# Patient Record
Sex: MALE | Race: WHITE | NOT HISPANIC OR LATINO | URBAN - METROPOLITAN AREA
[De-identification: names, ages, dates, MRNs, and addresses within clinical notes are randomized per-mention and may not be internally consistent; named-entity substitution may affect disease eponyms.]

---

## 2017-02-16 ENCOUNTER — OFFICE VISIT - HEALTHEAST (OUTPATIENT)
Dept: FAMILY MEDICINE | Facility: CLINIC | Age: 37
End: 2017-02-16

## 2017-02-16 ENCOUNTER — COMMUNICATION - HEALTHEAST (OUTPATIENT)
Dept: FAMILY MEDICINE | Facility: CLINIC | Age: 37
End: 2017-02-16

## 2017-02-16 DIAGNOSIS — R05.9 COUGH: ICD-10-CM

## 2017-02-16 DIAGNOSIS — J02.9 SORE THROAT: ICD-10-CM

## 2017-04-02 ENCOUNTER — RECORDS - HEALTHEAST (OUTPATIENT)
Dept: ADMINISTRATIVE | Facility: OTHER | Age: 37
End: 2017-04-02

## 2017-09-05 ENCOUNTER — RECORDS - HEALTHEAST (OUTPATIENT)
Dept: ADMINISTRATIVE | Facility: OTHER | Age: 37
End: 2017-09-05

## 2017-11-06 ENCOUNTER — OFFICE VISIT (OUTPATIENT)
Dept: OPHTHALMOLOGY | Facility: CLINIC | Age: 37
End: 2017-11-06
Attending: OPHTHALMOLOGY
Payer: COMMERCIAL

## 2017-11-06 DIAGNOSIS — H52.13 MYOPIC ASTIGMATISM OF BOTH EYES: Primary | ICD-10-CM

## 2017-11-06 DIAGNOSIS — H52.203 MYOPIC ASTIGMATISM OF BOTH EYES: Primary | ICD-10-CM

## 2017-11-06 PROCEDURE — 99213 OFFICE O/P EST LOW 20 MIN: CPT | Mod: ZF

## 2017-11-06 PROCEDURE — 92015 DETERMINE REFRACTIVE STATE: CPT | Mod: ZF

## 2017-11-06 RX ORDER — ALBUTEROL SULFATE 90 UG/1
2 AEROSOL, METERED RESPIRATORY (INHALATION)
COMMUNITY
Start: 2017-02-16

## 2017-11-06 ASSESSMENT — TONOMETRY
IOP_METHOD: TONOPEN
OS_IOP_MMHG: 24
OD_IOP_MMHG: 20

## 2017-11-06 ASSESSMENT — VISUAL ACUITY
METHOD: SNELLEN - LINEAR
CORRECTION_TYPE: GLASSES
OD_CC: 20/15
OS_CC+: +2
OD_CC+: -2
OS_CC: 20/20

## 2017-11-06 ASSESSMENT — CONF VISUAL FIELD
OS_NORMAL: 1
OD_NORMAL: 1
METHOD: COUNTING FINGERS

## 2017-11-06 ASSESSMENT — EXTERNAL EXAM - RIGHT EYE: OD_EXAM: NORMAL

## 2017-11-06 ASSESSMENT — EXTERNAL EXAM - LEFT EYE: OS_EXAM: NORMAL

## 2017-11-06 ASSESSMENT — REFRACTION_WEARINGRX
SPECS_TYPE: SVL
OD_CYLINDER: +0.25
OS_CYLINDER: SPHERE
OS_SPHERE: -0.75
OD_AXIS: 070
OD_SPHERE: -0.75

## 2017-11-06 ASSESSMENT — REFRACTION_MANIFEST
OD_SPHERE: -0.75
OD_AXIS: 095
OD_CYLINDER: +0.50
OS_SPHERE: -1.25
OS_CYLINDER: SPHERE

## 2017-11-06 ASSESSMENT — SLIT LAMP EXAM - LIDS
COMMENTS: NORMAL
COMMENTS: NORMAL

## 2017-11-06 ASSESSMENT — CUP TO DISC RATIO
OD_RATIO: 0.2
OS_RATIO: 0.2

## 2017-11-06 NOTE — MR AVS SNAPSHOT
After Visit Summary   2017    Jaspal Pascual    MRN: [id number]           Patient Information     Date Of Birth          1980        Visit Information        Provider Department      2017 1:00 PM [facility]        Today's Diagnoses     Myopic astigmatism of both eyes    -  1       Follow-ups after your visit        Follow-up notes from your care team     Return in about 1 year (around 2018).      Who to contact     Please call your clinic at [phone] to:    Ask questions about your health    Make or cancel appointments    Discuss your medicines    Learn about your test results    Speak to your doctor   If you have compliments or concerns about an experience at your clinic, or if you wish to file a complaint, please contact [facility] Patient Relations at [phone] or email us at [email or web address]         Additional Information About Your Visit        MyChart Information     RedKLEVERt is an electronic gateway that provides easy, online access to your medical records. With Ulmart, you can request a clinic appointment, read your test results, renew a prescription or communicate with your care team.     To sign up for RedKLEVERt visit the website at [email or web address]   You will be asked to enter the access code listed below, as well as some personal information. Please follow the directions to create your username and password.     Your access code is: [email or web address]  Expires: 2018  5:30 AM     Your access code will  in 90 days. If you need help or a new code, please contact your [facility] or call [phone] for assistance.        Care EveryWhere ID     This is your Care EveryWhere ID. This could be used by other organizations to access your [facility] medical records  [id number]         Blood Pressure from Last 3 Encounters:   No data found for BP    Weight from  Last 3 Encounters:   No data found for Wt              Today, you had the following     No orders found for display       Primary Care Provider Office Phone # Fax #    Arie Finley [phone]       [facility] [place] [address]  [place] [facility] [address]        Equal Access to Services     PANFILO DODD : Hadii aad ku hadasho Soomaali, waaxda luqadaha, qaybta kaalmada adeegyada, waxay idiin hayaan adeeg khcheyenneangie lalivn . So [facility] [phone].    ATENCIÓN: Si habla español, tiene a galeano disposición servicios gratuitos de asistencia lingüística. [facility] [phone].    We comply with applicable federal civil rights laws and [place] laws. We do not discriminate on the basis of race, color, national origin, age, disability, sex, sexual orientation, or gender identity.            Thank you!     Thank you for choosing EYE CLINIC  for your care. Our goal is always to provide you with excellent care. Hearing back from our patients is one way we can continue to improve our services. Please take a few minutes to complete the written survey that you may receive in the mail after your visit with us. Thank you!             Your Updated Medication List - Protect others around you: Learn how to safely use, store and throw away your medicines at www.disposemymeds.org.          This list is accurate as of: 11/6/17  2:01 PM.  Always use your most recent med list.                   Brand Name Dispense Instructions for use Diagnosis    albuterol 108 (90 BASE) MCG/ACT Inhaler    PROAIR HFA/PROVENTIL HFA/VENTOLIN HFA     Inhale 2 puffs into the lungs        MULTIVITAMIN ADULT PO      Take 1 tablet by mouth daily        vitamin E 200 UNIT capsule      Take 200 Units by mouth daily

## 2017-11-06 NOTE — NURSING NOTE
Chief Complaints and History of Present Illnesses   Patient presents with     Blurred Vision Both Eyes     HPI    Affected eye(s):  Both   Symptoms:     Floaters (Comment: Occasional floaters in both eyes, no changes.)   No flashes   No redness   No Dryness         Do you have eye pain now?:  No      Comments:  Pt states vision appearing more blurry both distance and near. No eye pain today.    Sánchez VELAZQUEZ November 6, 2017 1:26 PM

## 2017-11-06 NOTE — PROGRESS NOTES
HPI  Jaspal Pascual is a 37 year old male here for full eye exam. He notes his vision is mildly blurred in both eyes at near and distance. No pain, redness, discharge. No flashes/floaters.    POH: Strabismus s/p surgery as a child  PMH: Asthma  FH: No FH of AMD or glc  SH: Former-smoker    Assessment & Plan      (H52.203,  H52.13) Myopic astigmatism of both eyes  (primary encounter diagnosis)  Comment: Excellent vision with refraction  Plan: Given updated glasses Rx.       -----------------------------------------------------------------------------------    Patient disposition:   Return in about 1 year (around 11/6/2018). or sooner as needed.    Teaching statement:  Complete documentation of historical and exam elements from today's encounter can be found in the full encounter summary report (not reduplicated in this progress note). I personally obtained the chief complaint(s) and history of present illness.  I confirmed and edited as necessary the review of systems, past medical/surgical history, family history, social history, and examination findings as documented by others; and I examined the patient myself. I personally reviewed the relevant tests, images, and reports as documented above.     I formulated and edited as necessary the assessment and plan and discussed the findings and management plan with the patient and family.    Edwina Marie MD  Comprehensive Ophthalmology & Ocular Pathology  Department of Ophthalmology and Visual Neurosciences  [email or web address]  Pager [phone]

## 2017-11-08 ENCOUNTER — RECORDS - HEALTHEAST (OUTPATIENT)
Dept: ADMINISTRATIVE | Facility: OTHER | Age: 37
End: 2017-11-08

## 2017-11-13 ENCOUNTER — RECORDS - HEALTHEAST (OUTPATIENT)
Dept: ADMINISTRATIVE | Facility: OTHER | Age: 37
End: 2017-11-13

## 2017-12-21 ENCOUNTER — COMMUNICATION - HEALTHEAST (OUTPATIENT)
Dept: FAMILY MEDICINE | Facility: CLINIC | Age: 37
End: 2017-12-21

## 2017-12-21 ENCOUNTER — OFFICE VISIT - HEALTHEAST (OUTPATIENT)
Dept: FAMILY MEDICINE | Facility: CLINIC | Age: 37
End: 2017-12-21

## 2017-12-21 DIAGNOSIS — R05.9 COUGH: ICD-10-CM

## 2017-12-21 DIAGNOSIS — R53.83 FATIGUE: ICD-10-CM

## 2018-02-07 ENCOUNTER — RECORDS - HEALTHEAST (OUTPATIENT)
Dept: ADMINISTRATIVE | Facility: OTHER | Age: 38
End: 2018-02-07

## 2018-06-28 ENCOUNTER — RECORDS - HEALTHEAST (OUTPATIENT)
Dept: ADMINISTRATIVE | Facility: OTHER | Age: 38
End: 2018-06-28

## 2018-10-24 ENCOUNTER — OFFICE VISIT - HEALTHEAST (OUTPATIENT)
Dept: FAMILY MEDICINE | Facility: CLINIC | Age: 38
End: 2018-10-24

## 2018-10-24 DIAGNOSIS — H60.92 OTITIS EXTERNA, LEFT: ICD-10-CM

## 2018-10-24 DIAGNOSIS — J01.90 ACUTE SINUSITIS: ICD-10-CM

## 2018-10-24 DIAGNOSIS — J20.9 ACUTE BRONCHITIS: ICD-10-CM

## 2019-01-15 ENCOUNTER — OFFICE VISIT - HEALTHEAST (OUTPATIENT)
Dept: FAMILY MEDICINE | Facility: CLINIC | Age: 39
End: 2019-01-15

## 2019-01-15 DIAGNOSIS — R07.0 THROAT PAIN: ICD-10-CM

## 2019-01-15 DIAGNOSIS — J06.9 VIRAL URI WITH COUGH: ICD-10-CM

## 2019-01-15 LAB — DEPRECATED S PYO AG THROAT QL EIA: NORMAL

## 2019-01-16 LAB — GROUP A STREP BY PCR: NORMAL

## 2019-01-22 ENCOUNTER — OFFICE VISIT - HEALTHEAST (OUTPATIENT)
Dept: FAMILY MEDICINE | Facility: CLINIC | Age: 39
End: 2019-01-22

## 2019-01-22 DIAGNOSIS — R05.9 COUGH: ICD-10-CM

## 2019-01-22 DIAGNOSIS — R68.83 CHILLS: ICD-10-CM

## 2019-01-23 ENCOUNTER — OFFICE VISIT - HEALTHEAST (OUTPATIENT)
Dept: INTERNAL MEDICINE | Facility: CLINIC | Age: 39
End: 2019-01-23

## 2019-01-23 ENCOUNTER — COMMUNICATION - HEALTHEAST (OUTPATIENT)
Dept: FAMILY MEDICINE | Facility: CLINIC | Age: 39
End: 2019-01-23

## 2019-01-23 DIAGNOSIS — J40 BRONCHITIS: ICD-10-CM

## 2019-01-23 ASSESSMENT — MIFFLIN-ST. JEOR: SCORE: 2085.13

## 2019-04-05 ENCOUNTER — OFFICE VISIT - HEALTHEAST (OUTPATIENT)
Dept: FAMILY MEDICINE | Facility: CLINIC | Age: 39
End: 2019-04-05

## 2019-04-05 ENCOUNTER — COMMUNICATION - HEALTHEAST (OUTPATIENT)
Dept: FAMILY MEDICINE | Facility: CLINIC | Age: 39
End: 2019-04-05

## 2019-04-05 DIAGNOSIS — Z20.2 POTENTIAL EXPOSURE TO STD: ICD-10-CM

## 2019-04-05 DIAGNOSIS — R30.0 DYSURIA: ICD-10-CM

## 2019-04-05 DIAGNOSIS — M54.50 ACUTE BILATERAL LOW BACK PAIN WITHOUT SCIATICA: ICD-10-CM

## 2019-04-05 LAB
ALBUMIN UR-MCNC: ABNORMAL MG/DL
AMORPH CRY #/AREA URNS HPF: ABNORMAL /[HPF]
APPEARANCE UR: CLEAR
BACTERIA #/AREA URNS HPF: ABNORMAL HPF
BILIRUB UR QL STRIP: NEGATIVE
COLOR UR AUTO: YELLOW
GLUCOSE UR STRIP-MCNC: NEGATIVE MG/DL
HGB UR QL STRIP: NEGATIVE
KETONES UR STRIP-MCNC: NEGATIVE MG/DL
LEUKOCYTE ESTERASE UR QL STRIP: ABNORMAL
MUCOUS THREADS #/AREA URNS LPF: ABNORMAL LPF
NITRATE UR QL: NEGATIVE
PH UR STRIP: 6.5 [PH] (ref 5–8)
RBC #/AREA URNS AUTO: ABNORMAL HPF
SP GR UR STRIP: 1.02 (ref 1–1.03)
SQUAMOUS #/AREA URNS AUTO: ABNORMAL LPF
UROBILINOGEN UR STRIP-ACNC: ABNORMAL
WBC #/AREA URNS AUTO: ABNORMAL HPF

## 2019-04-06 LAB — BACTERIA SPEC CULT: NO GROWTH

## 2019-04-08 ENCOUNTER — COMMUNICATION - HEALTHEAST (OUTPATIENT)
Dept: FAMILY MEDICINE | Facility: CLINIC | Age: 39
End: 2019-04-08

## 2019-04-08 LAB
C TRACH DNA SPEC QL PROBE+SIG AMP: NEGATIVE
N GONORRHOEA DNA SPEC QL NAA+PROBE: NEGATIVE

## 2019-09-19 ENCOUNTER — OFFICE VISIT - HEALTHEAST (OUTPATIENT)
Dept: FAMILY MEDICINE | Facility: CLINIC | Age: 39
End: 2019-09-19

## 2019-09-19 DIAGNOSIS — M70.22 OLECRANON BURSITIS OF LEFT ELBOW: ICD-10-CM

## 2019-09-19 DIAGNOSIS — R30.0 DYSURIA: ICD-10-CM

## 2019-09-19 DIAGNOSIS — M25.572 PAIN IN JOINT INVOLVING ANKLE AND FOOT, LEFT: ICD-10-CM

## 2019-09-19 LAB
ALBUMIN UR-MCNC: ABNORMAL MG/DL
APPEARANCE UR: CLEAR
BACTERIA #/AREA URNS HPF: ABNORMAL /[HPF]
BILIRUB UR QL STRIP: ABNORMAL
COLOR UR AUTO: YELLOW
GLUCOSE UR STRIP-MCNC: NEGATIVE MG/DL
HGB UR QL STRIP: NEGATIVE
KETONES UR STRIP-MCNC: ABNORMAL MG/DL
LEUKOCYTE ESTERASE UR QL STRIP: NEGATIVE
NITRATE UR QL: NEGATIVE
PH UR STRIP: 6.5 [PH] (ref 5–8)
RBC #/AREA URNS AUTO: ABNORMAL /[HPF]
SP GR UR STRIP: 1.02 (ref 1–1.03)
SQUAMOUS #/AREA URNS AUTO: ABNORMAL /[HPF]
UROBILINOGEN UR STRIP-ACNC: ABNORMAL
WBC #/AREA URNS AUTO: ABNORMAL /[HPF]

## 2019-09-20 LAB
C TRACH DNA SPEC QL PROBE+SIG AMP: NEGATIVE
N GONORRHOEA DNA SPEC QL NAA+PROBE: NEGATIVE

## 2019-12-05 ENCOUNTER — RECORDS - HEALTHEAST (OUTPATIENT)
Dept: ADMINISTRATIVE | Facility: OTHER | Age: 39
End: 2019-12-05

## 2020-03-18 ENCOUNTER — OFFICE VISIT - HEALTHEAST (OUTPATIENT)
Dept: FAMILY MEDICINE | Facility: CLINIC | Age: 40
End: 2020-03-18

## 2020-03-18 DIAGNOSIS — J20.9 ACUTE BRONCHITIS, UNSPECIFIED ORGANISM: ICD-10-CM

## 2020-03-18 DIAGNOSIS — Z20.828 EXPOSURE TO INFLUENZA: ICD-10-CM

## 2020-06-23 ENCOUNTER — RECORDS - HEALTHEAST (OUTPATIENT)
Dept: ADMINISTRATIVE | Facility: OTHER | Age: 40
End: 2020-06-23

## 2021-05-27 ENCOUNTER — RECORDS - HEALTHEAST (OUTPATIENT)
Dept: ADMINISTRATIVE | Facility: CLINIC | Age: 41
End: 2021-05-27

## 2021-05-27 NOTE — TELEPHONE ENCOUNTER
----- Message from Elisabeth Proctor CNP sent at 4/8/2019  2:22 PM CDT -----  Please let patient know of negative results

## 2021-05-30 VITALS — HEIGHT: 74 IN

## 2021-05-31 VITALS — BODY MASS INDEX: 30.81 KG/M2 | WEIGHT: 240 LBS

## 2021-06-01 NOTE — PATIENT INSTRUCTIONS - HE
Your urine test does not show any signs of a urinary tract infection.  We will hold off on any treatment until your Gonorrhea and Chlamydia test come back.  If your Gonorrhea and Chlamydia tests are negative I will recommend follow-up with urology for further investigation into why you are having burning urinary pain.    Her left ankle pain is likely from gout.  Begin taking prednisone 2 tablets daily for the next 5 days for gout treatment.    Your left elbow is swollen due to olecranon bursitis.  This does not appear to be infectious.  It could be a result of gout, but you are already started on gout treatment.  Recommend icing for 20 minutes at a time every 4-5 hours.  I also recommend protecting the joint using an Ace wrap or elbow brace.  Follow-up if you have not had any improvement in your elbow swelling over the course the next 7 days.

## 2021-06-01 NOTE — PROGRESS NOTES
Chief Complaint   Patient presents with     Gout     x3-4d, L ankle     Burning sensation when urinating     x2wks,      Fluid in L elbow     x2wks, slowly growing       HPI:  Jaspal Pascual is a 39 y.o. male who presents today complaining of multiple issues.  Patient has been experiencing suspected gout of the left ankle for the past 3 to 4 days.  He has previously had confirmed gout before in the same location.  Patient is also been experiencing burning dysuria for the past 2 weeks.  Patient was previously seen for dysuria on 2019.  At that time he was tested for urine culture, gonorrhea, and chlamydia.  He was negative for all of these tests.  He has had a single new sexual encounter since his last STD testing.  He was wearing protection, but the condom broke.  He denies any scrotal swelling, testicular pain, penile discharge, or redness or rash of the head of the penis, he is also not had any back pain out of the ordinary, fever, chills, abdominal pain, or lymph node in the groin area.  Patient has also been experiencing going amount of fluid on the left elbow for the past 2 weeks.  No known trauma.  He has not had any fevers or chills.  There is been no redness or warmth to the elbow.    History obtained from the patient.    Problem List:  Acute Pharyngitis  Hyperlipidemia  Abdominal Pain  Lower Back Pain  Pain During Urination (Dysuria)      No past medical history on file.    Social History     Tobacco Use     Smoking status: Former Smoker     Types: Cigarettes     Last attempt to quit: 2006     Years since quittin.7     Smokeless tobacco: Current User     Types: Chew   Substance Use Topics     Alcohol use: Not on file       Review of Systems    Vitals:    19 1243 19 1326   BP: (!) 141/91 138/88   Patient Site: Right Arm Right Arm   Patient Position: Sitting Sitting   Cuff Size: Adult Large Adult Large   Pulse: 62    Resp: 16    Temp: 98.1  F (36.7  C)    TempSrc: Oral    SpO2:  97%    Weight: (!) 257 lb 8 oz (116.8 kg)        Physical Exam    No notes on file    Labs:  Recent Results (from the past 72 hour(s))   Urinalysis-UC if Indicated   Result Value Ref Range    Color, UA Yellow Colorless, Yellow, Straw, Light Yellow    Clarity, UA Clear Clear    Glucose, UA Negative Negative    Bilirubin, UA Small (!) Negative    Ketones, UA Trace (!) Negative    Specific Gravity, UA 1.025 1.005 - 1.030    Blood, UA Negative Negative    pH, UA 6.5 5.0 - 8.0    Protein, UA 30 mg/dL (!) Negative mg/dL    Urobilinogen, UA 0.2 E.U./dL 0.2 E.U./dL, 1.0 E.U./dL    Nitrite, UA Negative Negative    Leukocytes, UA Negative Negative    Bacteria, UA      RBC, UA      WBC, UA      Squam Epithel, UA             Clinical Decision Making:  Left olecranon bursitis noted.  No current signs of septic bursitis.  This may be gout, however aspiration is not necessary as we are already treating for left ankle gout.  Recommend supportive cares with joint protection and icing.  Recommend that he follow-up if symptoms are not improving over the course the next 1 week.  Dysuria is becoming a chronic issue.  UA is not indicative of UTI.  GC/CT is pending.  We will hold off on any treatment as patient is not significantly high risk.  I recommend that he be referred to urology if his GC/CT are negative and his symptoms persist.  Patient is agreeable to this plan.  At the end of the encounter, I discussed results, diagnosis, medications. Discussed red flags for immediate return to clinic/ER, as well as indications for follow up if no improvement. Patient understood and agreed to plan. Patient was stable for discharge.    1. Olecranon bursitis of left elbow     2. Dysuria  Urinalysis-UC if Indicated    Chlamydia trachomatis & Neisseria gonorrhoeae, Amplified Detection   3. Pain in joint involving ankle and foot, left  predniSONE (DELTASONE) 20 MG tablet         Patient Instructions   Your urine test does not show any signs of a  urinary tract infection.  We will hold off on any treatment until your Gonorrhea and Chlamydia test come back.  If your Gonorrhea and Chlamydia tests are negative I will recommend follow-up with urology for further investigation into why you are having burning urinary pain.    Her left ankle pain is likely from gout.  Begin taking prednisone 2 tablets daily for the next 5 days for gout treatment.    Your left elbow is swollen due to olecranon bursitis.  This does not appear to be infectious.  It could be a result of gout, but you are already started on gout treatment.  Recommend icing for 20 minutes at a time every 4-5 hours.  I also recommend protecting the joint using an Ace wrap or elbow brace.  Follow-up if you have not had any improvement in your elbow swelling over the course the next 7 days.

## 2021-06-02 VITALS — BODY MASS INDEX: 32.1 KG/M2 | WEIGHT: 250 LBS

## 2021-06-02 VITALS — WEIGHT: 263.38 LBS | BODY MASS INDEX: 33.82 KG/M2

## 2021-06-02 VITALS — HEIGHT: 74 IN | WEIGHT: 247 LBS | BODY MASS INDEX: 31.7 KG/M2

## 2021-06-02 VITALS — BODY MASS INDEX: 31.19 KG/M2 | WEIGHT: 242.9 LBS

## 2021-06-02 VITALS — BODY MASS INDEX: 31.85 KG/M2 | WEIGHT: 248.1 LBS

## 2021-06-03 VITALS
OXYGEN SATURATION: 97 % | WEIGHT: 257.5 LBS | SYSTOLIC BLOOD PRESSURE: 138 MMHG | DIASTOLIC BLOOD PRESSURE: 88 MMHG | BODY MASS INDEX: 33.06 KG/M2 | RESPIRATION RATE: 16 BRPM | TEMPERATURE: 98.1 F | HEART RATE: 62 BPM

## 2021-06-04 VITALS
WEIGHT: 252.4 LBS | HEART RATE: 71 BPM | RESPIRATION RATE: 14 BRPM | BODY MASS INDEX: 32.41 KG/M2 | DIASTOLIC BLOOD PRESSURE: 81 MMHG | TEMPERATURE: 98.3 F | OXYGEN SATURATION: 98 % | SYSTOLIC BLOOD PRESSURE: 144 MMHG

## 2021-06-08 NOTE — PROGRESS NOTES
SUBJECTIVE:    This is a pleasant 37-year-old male who presents to the clinic as he has had a 1-2 week history of a constellation of symptoms.  He has developed a harsh cough and his cough has become increasingly productive.  He has had a sense of tightness in the chest and feels wheezy.  He denies known history of asthma.  He is not a smoker.  He has had some pressure in his cheeks.  His energy level has been quite low.        Patient Active Problem List   Diagnosis     Acute Pharyngitis     Hyperlipidemia     Abdominal Pain     Lower Back Pain     Pain During Urination (Dysuria)       No current outpatient prescriptions on file prior to visit.     No current facility-administered medications on file prior to visit.        No Known Allergies         A 12 point comprehensive review of systems was negative except as noted.      OBJECTIVE:     Vitals:    02/16/17 1622   BP: 138/80   Pulse: 83   Resp: 20   Temp: 98.8  F (37.1  C)   SpO2: 95%       General: Alert, not acutely distressed   Head:  Atraumatic, normocephalic  Ears:  TMs normal pearly-gray  Eyes:  PERRL, extraocular movements are intact  Nose:  Septum midline  Throat:  Oral mucosa moist, oropharynx clear  Neck:  Supple without adenopathy or thyromegaly   Cardiovascular: S1-S2 with regular rate and without murmur, rub, or gallop   Lungs:  Bilateral expiratory wheezes noted  There are coarse upper respiratory breath sounds  Extremities: Without cyanosis or edema   Neuro:  CN II-XII appear intact        Laboratory Results:    Results for orders placed or performed in visit on 02/16/17   Rapid Strep A Screen-Throat   Result Value Ref Range    Rapid Strep A Antigen No Group A Strep detected No Group A Strep detected   Influenza A/B Rapid Test   Result Value Ref Range    Influenza  A, Rapid Antigen No Influenza A antigen detected No Influenza A antigen detected    Influenza B, Rapid Antigen No Influenza B antigen detected No Influenza B antigen detected   Group A  Strep, RNA Direct Detection, Throat   Result Value Ref Range    Group A Strep by PCR No Group A Strep rRNA detected No Group A Strep rRNA detected     Radiology:    XR Chest PA and Lateral (Order [id number])   Imaging   Date: 2/16/2017 Department: [facility] Medicine and Obstetrics Ordering/Authorizing: Arie Ramos MD   Study Result   XR CHEST PA AND LATERAL2/16/2017 5:13 PMINDICATION: CoughCOMPARISON: None.FINDINGS: Negative chest.This report was electronically interpreted by: Dr. Harvinder Angel MD ON 02/16/2017 at 21:52         ASSESSMENT AND PLAN:    1. Cough  2. Sore throat    Chest x-ray is negative for acute infiltrate  Rapid strep test is negative and influenza testing is negative  - Rapid Strep A Screen-Throat  - Influenza A/B Rapid Test  - Group A Strep, RNA Direct Detection, Throat  - XR Chest PA and Lateral    Given his clinical presentation treat with azithromycin, Z-Bob for possible subclinical pneumonia or atypical infection  Given significant wheezing treated with prednisone for 5 days  Prescribed albuterol inhaler to use as needed  If he has recurrent problems with wheezing consider referral for pulmonary function testing  Patient agrees to follow-up as needed or if not improving    25 minutes were spent with the patient and greater than 50% of the time was spent in face to face counseling and coordination of care        Arie Ramos MD      This note has been dictated and transcribed using voice recognition software.  Any grammatical or contextual distortions are unintentional and inherent to the software.

## 2021-06-14 NOTE — PROGRESS NOTES
Assessment/ Plan     1. Cough  As patient has been coughing for over 3 weeks and is feeling quite fatigued, could make an argument to treat for bronchitis.  Will put him on a Z-Bob as directed.  We will get him started on an inhaler 2 puffs every 4-6 hours as needed.  Chest x-ray was negative for infiltrate.  Follow-up if symptoms not improving over the next several weeks.  - XR Chest 2 Views  - azithromycin (ZITHROMAX) 250 MG tablet; Take two tablets po on day one then one tablet po q day on days 2-5.  Dispense: 6 tablet; Refill: 0  - albuterol (PROAIR HFA;PROVENTIL HFA;VENTOLIN HFA) 90 mcg/actuation inhaler; Inhale 2 puffs every 6 (six) hours as needed for wheezing or shortness of breath.  Dispense: 1 Inhaler; Refill: 1    2. Fatigue  Reassurance given to patient that his CBC and mono are both normal today.  This is all likely result of a viral process.  Recommend rest, fluids, and follow-up if no improvement in symptoms over the next month or so.  If diarrhea is not resolving in the next several weeks, would recommend a coming back in to check for C. difficile as he was on antibiotics last month for strep throat.  Reassurance given that the red spots on his chest are normal and happen with aging.  - HM2(CBC w/o Differential)  - Mononucleosis Screen      Subjective:       Jaspal Pascual is a 37 y.o. male who presents for evaluation of an illness.  He states that for about the last 3 weeks he has had extreme fatigue.  He sleeping 10-11 hours per night and still feeling tired.  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  He said a cough which has been productive and seems to be worse at night.  He has had diarrhea but no blood in the stool or vomiting.  He has not had a fever.  He has had a couple of red spots on his chest that he wanted me to check out.  He states he had strep about a month ago and was treated with penicillin.  He was feeling better and then this was a new illness.  It does not  feel like strep to him.  He is otherwise healthy roberto without a history of asthma or wheezing.  He is a non-smoker.  He does feel a little achy.  He is not having a sore throat.    Relevant past medical, family, surgical, and social history reviewed with patient, unless noted in HPI, not pertinent for this visit.    Review of Systems   A 12 point comprehensive review of systems was negative except as noted.      Current Outpatient Prescriptions   Medication Sig Dispense Refill     albuterol (PROAIR HFA;PROVENTIL HFA;VENTOLIN HFA) 90 mcg/actuation inhaler Inhale 2 puffs every 6 (six) hours as needed for wheezing or shortness of breath. 1 Inhaler 1     azithromycin (ZITHROMAX) 250 MG tablet Take two tablets po on day one then one tablet po q day on days 2-5. 6 tablet 0     predniSONE (DELTASONE) 20 MG tablet Take two po Qday x 5 days 10 tablet 0     No current facility-administered medications for this visit.        Objective:      /78 (Patient Site: Right Arm, Patient Position: Sitting, Cuff Size: Adult Regular)  Pulse 74  Temp 97.9  F (36.6  C)  Wt (!) 240 lb (108.9 kg)  SpO2 97%  BMI 30.81 kg/m2      General appearance: alert, appears stated age and cooperative  Head: Normocephalic, without obvious abnormality, atraumatic  Eyes: conjunctivae/corneas clear. PERRL, EOM's intact. Fundi benign.  Ears: normal TM's and external ear canals both ears  Nose: Nares normal. Septum midline. Mucosa normal. No drainage or sinus tenderness.  Throat: lips, mucosa, and tongue normal; teeth and gums normal  Neck: no adenopathy  Lungs: clear to auscultation bilaterally  Heart: regular rate and rhythm, S1, S2 normal, no murmur, click, rub or gallop    Chest x-ray: Personally reviewed, no infiltrate.    Recent Results (from the past 168 hour(s))   HM2(CBC w/o Differential)   Result Value Ref Range    WBC 5.7 4.0 - 11.0 thou/uL    RBC 5.26 4.40 - 6.20 mill/uL    Hemoglobin 15.3 14.0 - 18.0 g/dL    Hematocrit 45.9 40.0 - 54.0 %     MCV 87 80 - 100 fL    MCH 29.1 27.0 - 34.0 pg    MCHC 33.3 32.0 - 36.0 g/dL    RDW 11.1 11.0 - 14.5 %    Platelets 211 140 - 440 thou/uL    MPV 7.2 7.0 - 10.0 fL   Mononucleosis Screen   Result Value Ref Range    Mono Screen Negative Negative          This note has been dictated using voice recognition software. Any grammatical or context distortions are unintentional and inherent to the software

## 2021-06-17 NOTE — PATIENT INSTRUCTIONS - HE
Patient Instructions by Kael Ponce MD at 1/15/2019  3:50 PM     Author: Kael Ponce MD Service: -- Author Type: Physician    Filed: 1/15/2019  4:29 PM Encounter Date: 1/15/2019 Status: Addendum    : Kael Ponce MD (Physician)    Related Notes: Original Note by Kael Ponce MD (Physician) filed at 1/15/2019  4:29 PM       Advised fluids, salt water gargles, Tylenol or Ibuprofen as needed for sore throat.       Patient Education     Viral Upper Respiratory Illness (Adult)  You have a viral upper respiratory illness (URI), which is another term for the common cold. This illness is contagious during the first few days. It is spread through the air by coughing and sneezing. It may also be spread by direct contact (touching the sick person and then touching your own eyes, nose, or mouth). Frequent handwashing will decrease risk of spread. Most viral illnesses go away within 7 to 10 days with rest and simple home remedies. Sometimes the illness may last for several weeks. Antibiotics will not kill a virus, and they are generally not prescribed for this condition.    Home care    If symptoms are severe, rest at home for the first 2 to 3 days. When you resume activity, don't let yourself get too tired.    Avoid being exposed to cigarette smoke (yours or others).    You may use acetaminophen or ibuprofen to control pain and fever, unless another medicine was prescribed. If you have chronic liver or kidney disease, have ever had a stomach ulcer or gastrointestinal bleeding, or are taking blood-thinning medicines, talk with your healthcare provider before using these medicines. Aspirin should never be given to anyone under 18 years of age who is ill with a viral infection or fever. It may cause severe liver or brain damage.    Your appetite may be poor, so a light diet is fine. Avoid dehydration by drinking 6 to 8 glasses of fluids per day (water, soft drinks, juices, tea, or soup). Extra  fluids will help loosen secretions in the nose and lungs.    Over-the-counter cold medicines will not shorten the length of time youre sick, but they may be helpful for the following symptoms: cough, sore throat, and nasal and sinus congestion. (Note: Do not use decongestants if you have high blood pressure.)  Follow-up care  Follow up with your healthcare provider, or as advised.  When to seek medical advice  Call your healthcare provider right away if any of these occur:    Cough with lots of colored sputum (mucus)    Severe headache; face, neck, or ear pain    Difficulty swallowing due to throat pain    Fever of 100.4 F (38 C) or higher, or as directed by your healthcare provider  Call 911  Call 911 if any of these occur:    Chest pain, shortness of breath, wheezing, or difficulty breathing    Coughing up blood    Inability to swallow due to throat pain  Date Last Reviewed: 9/13/2015 2000-2017 [employer]. [address] [place] [address], [place] [address]. All rights reserved. This information is not intended as a substitute for professional medical care. Always follow your healthcare professional's instructions.

## 2021-06-17 NOTE — PATIENT INSTRUCTIONS - HE
Patient Instructions by Elisabeth Proctor CNP at 4/5/2019  8:20 AM     Author: Elisabeth Proctor CNP Service: -- Author Type: Nurse Practitioner    Filed: 4/5/2019  8:55 AM Encounter Date: 4/5/2019 Status: Addendum    : Elisabeth Proctor CNP (Nurse Practitioner)    Related Notes: Original Note by Elisabeth Proctor CNP (Nurse Practitioner) filed at 4/5/2019  8:53 AM       Your back pain is likely due to a muscular strain.     Use Naproxen for pain management.    You may use the Zanaflex as needed for muscle spasm. Use caution while taking this medication, as it can make you drowsy. Do not take while driving, operating heavy machinery, or doing any activities requiring intense concentration.    Try using a heating pad and/or warm baths.    Make sure to keep moving to avoid getting stiff. See below for stretching exercises.    If you develop fever, severe pain that prevents you from walking at all, weakness of your arms or legs, loss of bowel or bladder continence, or any other new concerning symptoms, go to the ER immediately.    Otherwise, follow up with primary care doctor as needed or if no improvement in pain in symptoms in 1 week.    You have been treated today to cover for gonorrhea and chlamydia infections.  These infections are treated with one time doses of medications, so no further treatment is necessary.  Avoid sexual contact for one week.  We will contact you when test results are available, usually 2 days.  Consider comprehensive STD testing including HIV and syphilis.  This can be done through your primary care provider if you change your mind.  Use condoms to avoid reinfection.              Ibuprofen/Naproxen Discharge Instructions:  You have been prescribed Naproxen for pain control.  The maximum dose of naproxen is 1100 mg in a 24-hour period.    Take this medication with food to prevent stomach irritation.  With long-term use this medication can irritate the stomach causing pain and  lead to development of a stomach ulcer.  If you notice stomach pain or vomiting of coffee-ground colored vomit or blood, please be seen by a healthcare provider.  Attempt to use this medication for the shortest time possible.

## 2021-06-18 NOTE — LETTER
Letter by Kael Ponce MD at      Author: Kael Ponce MD Service: -- Author Type: --    Filed:  Encounter Date: 1/15/2019 Status: (Other)       January 15, 2019     Patient: Jaspal Pascual   YOB: 1980   Date of Visit: 1/15/2019       To Whom It May Concern:    It is my medical opinion that Jaspal Pascual may return to work on 1/16/19.    If you have any questions or concerns, please don't hesitate to call.    Sincerely,        Electronically signed by Kael Ponce MD

## 2021-06-18 NOTE — LETTER
Letter by Arie Ramos MD at      Author: Arie Ramos MD Service: -- Author Type: --    Filed:  Encounter Date: 1/23/2019 Status: (Other)       January 23, 2019     Patient: Jaspal Pascual   YOB: 1980   Date of Visit: 1/23/2019       To Whom It May Concern:    I am the primary care physician of Jaspal Pascual.  He has had a significant respiratory illness recently and has had 2 urgent care visits including January 15 and January 22, 2019.  Given his respiratory illness please excuse him from work from January 21 - January 24, 2019.  He is following the treatment recommendations.    Thank you for your time and consideration.    If you have any questions or concerns, please don't hesitate to call.    Sincerely,        Electronically signed by Arie Ramos MD

## 2021-06-18 NOTE — PATIENT INSTRUCTIONS - HE
Patient Instructions by Dimitrios Hobson PA-C at 3/18/2020  9:10 AM     Author: Dimitrios Hobson PA-C Service: -- Author Type: Physician Assistant    Filed: 3/18/2020  9:37 AM Encounter Date: 3/18/2020 Status: Addendum    : Dimitrios Hobson PA-C (Physician Assistant)    Related Notes: Original Note by Dimitrios Hobson PA-C (Physician Assistant) filed at 3/18/2020  9:36 AM       You were seen today for acute bronchitis.     Symptom management:  - Get plenty of rest  - Avoid smoking and second hand smoke  - May take tylenol or ibuprofen for fever/discomfort  - Drink plenty of non-caffeinated fluids  - Use nasal steroid spray for sinus congestion  - Albuterol inhaler may be used every 6 hours as needed for chest tightness      Reasons to be seen in the emergency room:  - Develop a fever of 100.4 or higher  - Cough changes, coughing up blood, or become short of breath  - Neck stiffness  - Chest pain  - Severe headache  - Unable to tolerate eating or drinking fluids    Otherwise, if no symptom improvement after 5 days, follow-up with your primary care provider.      Patient Education     Bronchitis, Antibiotic Treatment (Adult)    Bronchitis is an infection of the air passages (bronchial tubes) in your lungs. It often occurs when you have a cold. This illness is contagious during the first few days and is spread through the air by coughing and sneezing, or by direct contact (touching the sick person and then touching your own eyes, nose, or mouth).  Symptoms of bronchitis include cough with mucus (phlegm) and low-grade fever. Bronchitis usually lasts 7 to 14 days. Mild cases can be treated with simple home remedies. More severe infection is treated with an antibiotic.  Home care  Follow these guidelines when caring for yourself at home:    If your symptoms are severe, rest at home for the first 2 to 3 days. When you go back to your usual activities, don't let yourself get too tired.    Do not smoke. Also avoid being exposed to  secondhand smoke.    You may use over-the-counter medicines to control fever or pain, unless another medicine was prescribed. If you have chronic liver or kidney disease or have ever had a stomach ulcer or gastrointestinal bleeding, talk with your healthcare provider before using these medicines. Also talk to your provider if you are taking medicine to prevent blood clots. Aspirin should never be given to anyone younger than 18 years of age who is ill with a viral infection or fever. It may cause severe liver or brain damage.    Your appetite may be poor, so a light diet is fine. Avoid dehydration by drinking 6 to 8 glasses of fluids per day (such as water, soft drinks, sports drinks, juices, tea, or soup). Extra fluids will help loosen secretions in the nose and lungs.    Over-the-counter cough, cold, and sore-throat medicines will not shorten the length of the illness, but they may be helpful to reduce symptoms. (Note: Do not use decongestants if you have high blood pressure.)    Finish all antibiotic medicine. Do this even if you are feeling better after only a few days.  Follow-up care  Follow up with your healthcare provider, or as advised. If you had an X-ray or ECG (electrocardiogram), a specialist will review it. You will be notified of any new findings that may affect your care.  If you are age 65 or older, or if you have a chronic lung disease or condition that affects your immune system, or you smoke, ask your healthcare provider about getting a pneumococcal vaccine and a yearly flu shot (influenza vaccine).  When to seek medical advice  Call your healthcare provider right away if any of these occur:    Fever of 100.4 F (38 C) or higher, or as directed by your healthcare provider    Coughing up increased amounts of colored sputum    Weakness, drowsiness, headache, facial pain, ear pain, or a stiff neck  Call 911  Call 911 if any of these occur.    Coughing up blood    Worsening weakness, drowsiness,  headache, or stiff neck    Trouble breathing, wheezing, or pain with breathing  Date Last Reviewed: 9/13/2015 2000-2017 [employer]. [address] [place] [address], [place] [address]. All rights reserved. This information is not intended as a substitute for professional medical care. Always follow your healthcare professional's instructions.

## 2021-06-19 NOTE — LETTER
Letter by Eva Arredondo PA-C at      Author: Eva Arredondo PA-C Service: -- Author Type: --    Filed:  Encounter Date: 9/19/2019 Status: (Other)         September 19, 2019     Patient: Jaspal Pascual   YOB: 1980   Date of Visit: 9/19/2019       To Whom it May Concern:    Jaspal Pascual was seen in my clinic on 9/19/2019.    If you have any questions or concerns, please don't hesitate to call.    Sincerely,         Electronically signed by Eva Arredondo PA-C

## 2021-06-20 NOTE — LETTER
Letter by Dimitrios Hobson PA-C at      Author: Dimitrios Hobson PA-C Service: -- Author Type: --    Filed:  Encounter Date: 3/18/2020 Status: (Other)         March 18, 2020     Patient: Jaspal Pascual   YOB: 1980   Date of Visit: 3/18/2020       To Whom It May Concern:    It is my medical opinion that Jaspal Pascual may return to work on 03/19/2020.    If you have any questions or concerns, please don't hesitate to call.    Sincerely,        Electronically signed by Dimitrios Hobson PA-C

## 2021-06-20 NOTE — LETTER
Letter by Dimitrios Hobson PA-C at      Author: Dimitrios Hobson PA-C Service: -- Author Type: --    Filed:  Encounter Date: 3/18/2020 Status: (Other)         March 18, 2020     Patient: Jaspal Pascual   YOB: 1980   Date of Visit: 3/18/2020       To Whom it May Concern:    Jaspal Pascual was seen in my clinic on 3/18/2020.  Patient is able to return to work on 3/18/2020.    If you have any questions or concerns, please don't hesitate to call.    Sincerely,         Electronically signed by Dimitrios Hobson PA-C

## 2021-06-21 NOTE — PROGRESS NOTES
Chief Complaint   Patient presents with     Cough     x 3.5 weeks.  Not getting better.  Ears hurt and bleeding.  Nose blowing blood.  No fevers but body aches and chills  Starting to feel deep in chest.         HPI:      Patient is here for 3.5 wks of green nasal discharge with congestion and sinus pressure, and a cough productive with green sputum. Sometimes he noted bloody nasal discharge after he blew his nose, no persistent nose bleeding. Yesterday he noted small bleeding from left ear. No ear pain. No fever, shortness of breath. But he reported body aches and chills.     ROS: Pertinent ROS noted in HPI.     Allergies   Allergen Reactions     Simvastatin Myalgia     All statins have same effect.       Patient Active Problem List   Diagnosis     Acute Pharyngitis     Hyperlipidemia     Abdominal Pain     Lower Back Pain     Pain During Urination (Dysuria)       No family history on file.    Social History     Social History     Marital status: Single     Spouse name: N/A     Number of children: N/A     Years of education: N/A     Occupational History     Not on file.     Social History Main Topics     Smoking status: Former Smoker     Types: Cigarettes     Quit date: 1/1/2006     Smokeless tobacco: Current User     Types: Chew     Alcohol use Not on file     Drug use: Not on file     Sexual activity: Not on file     Other Topics Concern     Not on file     Social History Narrative         Objective:    Vitals:    10/24/18 1003   BP: 124/84   Pulse: (!) 50   Resp: 24   Temp: 98.8  F (37.1  C)   SpO2: 96%       Gen: NAD  Throat: oropharynx clear, tonsils normal  Ears: TMs clear without effusion, R ear canal normal with minimal cerumen. There is moderate erythema of left ear canal with inflammation and there is a small scab on left ear canal without surrounding edema nor purulence.   Nose: boggy nasal mucosa bilaterally  Sinus: pain to percussion of maxillary sinuses bilaterally   Neck: No adenopathy  CV: RRR,  normal S1S2, no M,R, G  Pulm: CTAB, normal effort        Acute sinusitis  -     doxycycline (VIBRA-TABS) 100 MG tablet; Take 1 tablet (100 mg total) by mouth 2 (two) times a day for 10 days.    Acute bronchitis  -     doxycycline (VIBRA-TABS) 100 MG tablet; Take 1 tablet (100 mg total) by mouth 2 (two) times a day for 10 days.    Otitis externa, left  -     neomycin-polymyxin-hydrocortisone (CORTISPORIN) otic solution; Administer 3 drops into the left ear 3 (three) times a day for 10 days.      Supportive cares and f/u as directed.

## 2021-06-23 NOTE — PROGRESS NOTES
Chief Complaint   Patient presents with     Cough     x 1 weeks     Sore Throat     x 1 weeks     chest congestion     x 1weeks     Nasal Congestion     need Employer Letter         HPI      Patient is here for a wk of cough, runny nose with nasal congestion, sore throat. NO fever, chills, chest pain, shortness of breath.    ROS: Pertinent ROS noted in HPI.     Allergies   Allergen Reactions     Simvastatin Myalgia     All statins have same effect.       Patient Active Problem List   Diagnosis     Acute Pharyngitis     Hyperlipidemia     Abdominal Pain     Lower Back Pain     Pain During Urination (Dysuria)       No family history on file.    Social History     Socioeconomic History     Marital status: Single     Spouse name: Not on file     Number of children: Not on file     Years of education: Not on file     Highest education level: Not on file   Social Needs     Financial resource strain: Not on file     Food insecurity - worry: Not on file     Food insecurity - inability: Not on file     Transportation needs - medical: Not on file     Transportation needs - non-medical: Not on file   Occupational History     Not on file   Tobacco Use     Smoking status: Former Smoker     Types: Cigarettes     Last attempt to quit: 2006     Years since quittin.0     Smokeless tobacco: Current User     Types: Chew   Substance and Sexual Activity     Alcohol use: Not on file     Drug use: Not on file     Sexual activity: Not on file   Other Topics Concern     Not on file   Social History Narrative     Not on file         Objective:    Vitals:    01/15/19 1602   BP: 130/75   Pulse: 60   Resp: 20   Temp: 98.5  F (36.9  C)   SpO2: 96%       Gen:NAD  Throat: oropharynx clear, tonsils normal  Ears: TMs clear without effusion, ear canals normal with minimal cerumen  Nose: no discharge  Neck: No adenopathy  CV: RRR, normal S1S2, no M, R, G  Pulm: CTAB, normal effort    Recent Results (from the past 24 hour(s))   Rapid Strep A  Screen-Throat   Result Value Ref Range    Rapid Strep A Antigen No Group A Strep detected, presumptive negative No Group A Strep detected, presumptive negative         Viral URI with cough  -     fluticasone (FLONASE) 50 mcg/actuation nasal spray; Administer two sprays to each nostril once daily.  -     benzonatate (TESSALON) 200 MG capsule; Take 1 capsule (200 mg total) by mouth 3 (three) times a day as needed for cough.    Throat pain  -     Rapid Strep A Screen-Throat  -     Group A Strep, RNA Direct Detection, Throat

## 2021-06-23 NOTE — PROGRESS NOTES
Chief Complaint   Patient presents with     Cough     x2wks, pt states he was exposed to pneumonia and would like antibiotics, SOB, having trouble sleeping, sweats, productive cough in the am        ASSESSMENT & PLAN:   Diagnoses and all orders for this visit:    Cough  -     XR Chest 2 Views    Chills  -     XR Chest 2 Views        MDM:  Patient left before x-ray results were given to him in person.  Patient wanted results sent to my chart.  Sent results to my chart as requested.  Patient with cough and chills with fatigue.  Negative x-ray with normal lung sounds and vital signs.  Recommended supportive care and returning if worsening rather than getting better.    SUBJECTIVE    HPI:  Sick for about 2-3 week with cough, fatigue.  Fever to 100 two days ago.  Staying with sister who is currently in the hospital with pneumonia and PEs.      No h/o asthma.  Non-smoker.              History obtained from the patient.    No past medical history on file.    Problem List:  Acute Pharyngitis  Hyperlipidemia  Abdominal Pain  Lower Back Pain  Pain During Urination (Dysuria)      Social History     Tobacco Use     Smoking status: Former Smoker     Types: Cigarettes     Last attempt to quit: 2006     Years since quittin.0     Smokeless tobacco: Current User     Types: Chew   Substance Use Topics     Alcohol use: Not on file       Review of Systems   Constitutional: Positive for chills and fever.   HENT: Positive for ear pain (ear popping ). Negative for congestion and sore throat.    Respiratory: Positive for apnea (sleep apnea - CPAP) and cough.    Cardiovascular: Positive for chest pain (with cough ).   Gastrointestinal: Negative for nausea and vomiting.   Skin: Negative for rash.       OBJECTIVE    Vitals:    19 1221   BP: 134/84   Patient Site: Right Arm   Patient Position: Sitting   Cuff Size: Adult Large   Pulse: 74   Resp: 16   Temp: 97.9  F (36.6  C)   TempSrc: Oral   SpO2: 96%   Weight: (!) 248 lb 1.6 oz  (112.5 kg)       Physical Exam   Constitutional: He is oriented to person, place, and time. He appears well-developed and well-nourished. No distress.   HENT:   Right Ear: Tympanic membrane and external ear normal.   Left Ear: Tympanic membrane and external ear normal.   Eyes: Conjunctivae are normal. Right eye exhibits no discharge. Left eye exhibits no discharge.   Cardiovascular: Normal rate, regular rhythm, normal heart sounds and intact distal pulses.   Pulmonary/Chest: Effort normal and breath sounds normal. He has no wheezes. He has no rales.   Musculoskeletal: Normal range of motion.   Neurological: He is alert and oriented to person, place, and time.   Skin: Skin is warm and dry. Capillary refill takes less than 2 seconds.   Psychiatric: He has a normal mood and affect. His behavior is normal. Judgment and thought content normal.       Labs:  Recent Results (from the past 240 hour(s))   Rapid Strep A Screen-Throat   Result Value Ref Range    Rapid Strep A Antigen No Group A Strep detected, presumptive negative No Group A Strep detected, presumptive negative   Group A Strep, RNA Direct Detection, Throat   Result Value Ref Range    Group A Strep by PCR No Group A Strep rRNA detected No Group A Strep rRNA detected         Radiology:    Xr Chest 2 Views    Result Date: 1/22/2019  EXAM DATE:         01/22/2019 [facility] X-RAY CHEST, 2 VIEWS, FRONTAL AND LATERAL 1/22/2019 1:15 PM INDICATION: cough, chills, exposure to pneumonia COMPARISON: 12/21/2017 FINDINGS: Negative chest.       PATIENT INSTRUCTIONS:   There are no Patient Instructions on file for this visit.

## 2021-06-23 NOTE — TELEPHONE ENCOUNTER
I dictated a letter and routed to him via My Chart though I am not sure if it is accessible by him.  Please notify him that I will not be able to complete paperwork until back in clinic on Monday.  Depending on the circumstance he may need to see another provider if missing more work.

## 2021-06-23 NOTE — TELEPHONE ENCOUNTER
Who is calling:  Patient  Reason for Call:  Needs to have a work letter  He has missed Monday Tuesday and today and will probably miss tomorrow, he has been to [facility] twice and is still miserable.  Please send letter to Bobby  AS soon as possible.     Date of last appointment with primary care:   12/21/2017  Has the patient been recently seen:  Yes  Okay to leave a detailed message: Yes    He will be coming in to have someone fill out the FMLA paperwork

## 2021-06-23 NOTE — TELEPHONE ENCOUNTER
Spoke to pt, relayed MD message below.  Pt is scheduled in [facility] today for further evaluation.  He is aware Dr. Ramos is out of the office until Monday, so if needing FMLA paperwork completed, he will need to be seen by one of our providers.

## 2021-06-27 NOTE — PROGRESS NOTES
Progress Notes by Elisabeth Proctor CNP at 4/5/2019  8:20 AM     Author: Elisabeth Proctor CNP Service: -- Author Type: Nurse Practitioner    Filed: 4/5/2019  9:19 AM Encounter Date: 4/5/2019 Status: Signed    : Elisabeth Proctor CNP (Nurse Practitioner)       ASSESSMENT:   1. Dysuria  Urinalysis   2. Acute bilateral low back pain without sciatica  naproxen (NAPROSYN) 500 MG tablet    tiZANidine (ZANAFLEX) 4 MG tablet   3. Potential exposure to STD  Chlamydia trachomatis & Neisseria gonorrhoeae, Amplified Detection    cefTRIAXone injection 250 mg (ROCEPHIN)    azithromycin tablet 1,000 mg (ZITHROMAX)       PLAN:  Jaspal Pascual is a 39 y.o. male who presents to the clinic today for evaluation of back pain as well as dysuria and concern for STI.    Differential diagnosis for back pain includes muscle spasm/strain, slipped disc w/ radicular pain including sciatica, slipped disc w/ cauda equina syndrome,vertebral fracture, vertebral tumor, epidural abscess / discitis, or pyelonephritis.      I do not believe a fracture to be the source of this patient's pain as there was no preceding trauma.  Based on no trauma, no imaging of the spine was done.      I do not believe the pain is caused by an epidural abscess as the patient denies hx of IV drug use and does not have fevers/chills and no recent procedures.      I do not believe this patient's pain is from an infiltrative vertebral tumor as the patient does not have weight loss or night sweats and no known history of cancer.      I do not believe this patient's pain represents a rupture AAA.  There is no palpable, pulsatile mass on exam and patient does not have any ABD pain.      Patient does have urinary symptoms, however no CVA tenderness or fevers to suggest pyelonephritis.  Urinalysis shows trace leukocytes, few bacteria.    Based on history and exam, the most likely etiology of this patient's low back pain is muscle spasm/strain.  Emergent MRI is  not indicated as this patient does not have new weakness or cauda equina syndrome.  Patient has no bowel or bladder incontinence. Will treat today with naproxen and zanaflex, patient to follow up with PCP if no improvement over the next 5-7 days.  Will seek emergency care with new weakness/paresthesias, loss of continence, fever or worsening symptoms.    In terms of the dysuria and concern for STI, GC/chlamydia test is pending.  Discussed comprehensive testing with patient which he declines as he reports he is tested annually as he is a member of the [employer]. UA without clear evidence of UTI, no antibiotic treatment for this is initiated.  Culture pending, can be contacted once results available should this necessitate further treatment.  Covered for GC/chlamydia with ceftriaxone and azithromycin.  Counseled to avoid sexual contact for one week, will contact once results available.  Safe sexual practices encouraged.       I discussed red flag symptoms, return precautions to clinic/ER and follow up care with patient/parent.  Expected clinical course, symptomatic cares advised. Questions answered. Patient/parent amenable with plan.    Patient Instructions:  Patient Instructions   Your back pain is likely due to a muscular strain.     Use Naproxen for pain management.    You may use the Zanaflex as needed for muscle spasm. Use caution while taking this medication, as it can make you drowsy. Do not take while driving, operating heavy machinery, or doing any activities requiring intense concentration.    Try using a heating pad and/or warm baths.    Make sure to keep moving to avoid getting stiff. See below for stretching exercises.    If you develop fever, severe pain that prevents you from walking at all, weakness of your arms or legs, loss of bowel or bladder continence, or any other new concerning symptoms, go to the ER immediately.    Otherwise, follow up with primary care doctor as needed or if no  improvement in pain in symptoms in 1 week.    You have been treated today to cover for gonorrhea and chlamydia infections.  These infections are treated with one time doses of medications, so no further treatment is necessary.  Avoid sexual contact for one week.  We will contact you when test results are available, usually 2 days.  Consider comprehensive STD testing including HIV and syphilis.  This can be done through your primary care provider if you change your mind.                    SUBJECTIVE:   Jaspal Pascual is a 39 y.o. male who presents today with burning with urination and lower bilateral back pain for the past 3 days.  Questions STI as he had recent sexual contact with his ex wife who he believes to be in a relationship with someone else.  Denies hematuria, penile discharge, testicular pain. No flank pain.  Does endorse increased urinary frequency.  No injury to account for back pain.  No lower extremity weakness or paresthesias. No radicular pain. No loss of bowel or bladder continence.  No fevers, no abdominal pain, nausea or vomiting, no diarrhea.  No history of malignancy or IVDU.  Has not tried any medications for symptoms.      ROS:  Comprehensive 12 pt ROS completed, positives noted in HPI, otherwise negative.      Past Medical History:  Patient Active Problem List   Diagnosis   ? Acute Pharyngitis   ? Hyperlipidemia   ? Abdominal Pain   ? Lower Back Pain   ? Pain During Urination (Dysuria)       Surgical History:  No past surgical history on file.        Family History:  No family history on file.    Reviewed; Non-contributory    Social History     Tobacco Use   Smoking Status Former Smoker   ? Types: Cigarettes   ? Last attempt to quit: 2006   ? Years since quittin.2   Smokeless Tobacco Current User   ? Types: Chew       Current Medications:  Current Outpatient Medications on File Prior to Visit   Medication Sig Dispense Refill   ? disulfiram (ANTABUSE) 250 mg tablet Take 250 mg  by mouth daily.     ? gabapentin (NEURONTIN) 300 MG capsule Take 300 mg by mouth 3 (three) times a day.  0   ? venlafaxine (EFFEXOR-XR) 75 MG 24 hr capsule TK 1 C PO D  0   ? albuterol (PROAIR HFA;PROVENTIL HFA;VENTOLIN HFA) 90 mcg/actuation inhaler Inhale 2 puffs every 6 (six) hours as needed for wheezing or shortness of breath. 1 Inhaler 1   ? benzonatate (TESSALON) 200 MG capsule Take 1 capsule (200 mg total) by mouth 3 (three) times a day as needed for cough. 30 capsule 0   ? DM/p-ephed/acetaminoph/doxylam (NYQUIL D ORAL) Take by mouth.     ? fluticasone (FLONASE) 50 mcg/actuation nasal spray Administer two sprays to each nostril once daily. 16 g 0   ? sertraline (ZOLOFT) 100 MG tablet Take 200 mg by mouth daily.       No current facility-administered medications on file prior to visit.        Allergies:   Allergies   Allergen Reactions   ? Simvastatin Myalgia     All statins have same effect.       OBJECTIVE:   Vitals:    04/05/19 0824   BP: 135/87   Patient Site: Right Arm   Patient Position: Sitting   Cuff Size: Adult Large   Pulse: 68   Resp: 16   Temp: 97.8  F (36.6  C)   TempSrc: Oral   SpO2: 97%   Weight: (!) 263 lb 6 oz (119.5 kg)     Physical exam reveals a pleasant 39 y.o. male.   General Appearance:  Alert, cooperative, no distress, appears stated age. Afebrile. Appears comfortable sitting in chair. Rises from seated position without difficulty.  Integument: Warm, dry, no rashes or lesions.  HEENT: Atraumatic, normocephalic. Face nontraumatic. Conjunctiva clear, Lids normal.  Neck: Supple, no meningismus. No Cspine tenderness. Neck ROM intact.  Respiratory: No distress. Lungs clear to ausculation bilaterally. No crackles, wheezes, rhonchi or stridor.  Cardiovascular: Regular rate and rhythm, no murmur, rub or gallop. No obvious chest wall deformities.  Abdomen: soft, nontender, non-distended. No masses. No CVAT.  Musculoskeletal: Back: No Lspine or Tspine tenderness or crepitus to palpation. Mild TTP  bilateral lumbar paraspinal musculature. Strength testing: hip flexion, extension 5/5 bilaterally, knee flexion/extension 5/5 bilaterally, ankle plantar/dorsiflexion 5/5 bilaterally.  Straight leg raise negative. Gait: observed, no antalgia or abnormalities. Steady gait. 2+ bilateral pedal pulses.  Normal toe raise, heel walk. Normal flexion and extension of toes.  : Deferred per patient request.  Neurologic: Alert and orientated appropriately. No focal deficits. Sensation intact in distal LEs. DTRs: patellar 2+ bilaterally, achilles 2+ bilaterally.  Psych: Normal mood and affect.         RADIOLOGY    none    LABORATORY STUDIES    No results found for this or any previous visit (from the past 24 hour(s)).        Elisabeth Proctor, CNP

## 2021-06-28 NOTE — PROGRESS NOTES
Progress Notes by Dimitrios Hobson PA-C at 3/18/2020  9:10 AM     Author: Dimitrios Hobson PA-C Service: -- Author Type: Physician Assistant    Filed: 3/30/2020  2:12 PM Encounter Date: 3/18/2020 Status: Signed    : Dimitrios Hobson PA-C (Physician Assistant)       Subjective:      Patient ID: Jaspal Pascual is a 40 y.o. male.    Chief Complaint:    HPI     Jaspal Pascual is a 40 y.o. male who presents today complaining of cough productive of mucus and nasal congestion over the last 3 days.  Daughter has been diagnosed with Influenza A.  The patient is requesting to get a note to return to work.    At this time there is been no vomiting diarrhea skin rash abdominal pain or urinary symptoms, had no noted myalgias arthralgias loss of appetite or cough.  Patient has not tried treatment for this.  To include no over-the-counter antipyretic today.    No past medical history on file.    No past surgical history on file.    No family history on file.    Social History     Tobacco Use   ? Smoking status: Former Smoker     Types: Cigarettes     Last attempt to quit: 2006     Years since quittin.2   ? Smokeless tobacco: Current User     Types: Chew   Substance Use Topics   ? Alcohol use: Not on file   ? Drug use: Not on file       Review of Systems  As above in HPI, otherwise balance of Review of Systems are negative.    Objective:     /81   Pulse 71   Temp 98.3  F (36.8  C) (Oral)   Resp 14   Wt (!) 252 lb 6.4 oz (114.5 kg)   SpO2 98%   BMI 32.41 kg/m      Physical Exam  General: Patient is resting comfortably no acute distress is afebrile  HEENT: Head is normocephalic atraumatic   eyes are PERRL EOMI sclera anicteric   TMs are clear bilaterally  Throat is clear and no exudate  No cervical lymphadenopathy present  LUNGS: diffuse congestion throughout the lung fields and increased expiratory wheezes in the mid-lung fields to auscultation bilaterally  HEART: Regular rate and rhythm  Skin: Without  rash non-diaphoretic    Assessment:     Procedures    The primary encounter diagnosis was Exposure to influenza. A diagnosis of Acute bronchitis, unspecified organism was also pertinent to this visit.    Plan:     1. Exposure to influenza     2. Acute bronchitis, unspecified organism  albuterol (PROAIR HFA;PROVENTIL HFA;VENTOLIN HFA) 90 mcg/actuation inhaler    azithromycin (ZITHROMAX) 500 MG tablet       Patient has been exposed to influenza but does not have all the symptoms of the illness.  Also patient is beyond the treatment window for treatment with anti-.  Because the patient has had difficulty with cough will be treated with respiratory effort bronchitis.  Indication for return was gone over questions were answered patient satisfaction for discharge.    Patient Instructions   You were seen today for acute bronchitis.     Symptom management:  - Get plenty of rest  - Avoid smoking and second hand smoke  - May take tylenol or ibuprofen for fever/discomfort  - Drink plenty of non-caffeinated fluids  - Use nasal steroid spray for sinus congestion  - Albuterol inhaler may be used every 6 hours as needed for chest tightness      Reasons to be seen in the emergency room:  - Develop a fever of 100.4 or higher  - Cough changes, coughing up blood, or become short of breath  - Neck stiffness  - Chest pain  - Severe headache  - Unable to tolerate eating or drinking fluids    Otherwise, if no symptom improvement after 5 days, follow-up with your primary care provider.      Patient Education     Bronchitis, Antibiotic Treatment (Adult)    Bronchitis is an infection of the air passages (bronchial tubes) in your lungs. It often occurs when you have a cold. This illness is contagious during the first few days and is spread through the air by coughing and sneezing, or by direct contact (touching the sick person and then touching your own eyes, nose, or mouth).  Symptoms of bronchitis include cough with mucus (phlegm) and  low-grade fever. Bronchitis usually lasts 7 to 14 days. Mild cases can be treated with simple home remedies. More severe infection is treated with an antibiotic.  Home care  Follow these guidelines when caring for yourself at home:    If your symptoms are severe, rest at home for the first 2 to 3 days. When you go back to your usual activities, don't let yourself get too tired.    Do not smoke. Also avoid being exposed to secondhand smoke.    You may use over-the-counter medicines to control fever or pain, unless another medicine was prescribed. If you have chronic liver or kidney disease or have ever had a stomach ulcer or gastrointestinal bleeding, talk with your healthcare provider before using these medicines. Also talk to your provider if you are taking medicine to prevent blood clots. Aspirin should never be given to anyone younger than 18 years of age who is ill with a viral infection or fever. It may cause severe liver or brain damage.    Your appetite may be poor, so a light diet is fine. Avoid dehydration by drinking 6 to 8 glasses of fluids per day (such as water, soft drinks, sports drinks, juices, tea, or soup). Extra fluids will help loosen secretions in the nose and lungs.    Over-the-counter cough, cold, and sore-throat medicines will not shorten the length of the illness, but they may be helpful to reduce symptoms. (Note: Do not use decongestants if you have high blood pressure.)    Finish all antibiotic medicine. Do this even if you are feeling better after only a few days.  Follow-up care  Follow up with your healthcare provider, or as advised. If you had an X-ray or ECG (electrocardiogram), a specialist will review it. You will be notified of any new findings that may affect your care.  If you are age 65 or older, or if you have a chronic lung disease or condition that affects your immune system, or you smoke, ask your healthcare provider about getting a pneumococcal vaccine and a yearly flu shot  (influenza vaccine).  When to seek medical advice  Call your healthcare provider right away if any of these occur:    Fever of 100.4 F (38 C) or higher, or as directed by your healthcare provider    Coughing up increased amounts of colored sputum    Weakness, drowsiness, headache, facial pain, ear pain, or a stiff neck  Call 911  Call 911 if any of these occur.    Coughing up blood    Worsening weakness, drowsiness, headache, or stiff neck    Trouble breathing, wheezing, or pain with breathing  Date Last Reviewed: 9/13/2015 2000-2017 [employer]. [address] [place] [address]. All rights reserved. This information is not intended as a substitute for professional medical care. Always follow your healthcare professional's instructions.

## 2021-08-29 ENCOUNTER — HEALTH MAINTENANCE LETTER (OUTPATIENT)
Age: 41
End: 2021-08-29

## 2021-10-24 ENCOUNTER — HEALTH MAINTENANCE LETTER (OUTPATIENT)
Age: 41
End: 2021-10-24

## 2022-10-15 ENCOUNTER — HEALTH MAINTENANCE LETTER (OUTPATIENT)
Age: 42
End: 2022-10-15

## 2023-10-29 ENCOUNTER — HEALTH MAINTENANCE LETTER (OUTPATIENT)
Age: 43
End: 2023-10-29

## 2025-03-17 NOTE — PROGRESS NOTES
(E4) spontaneous Internal Medicine Office Visit  [facility]  Patient Name: Jaspal Pascual  Patient Age: 39 y.o.  YOB: 1980  MRN: [id number]    Date of Visit: 2019  Reason for Office Visit:   Chief Complaint   Patient presents with     Cough     X-Ray done 19. X-Ray was clear. Still having chest pain from the cough/Burning, SOB sometimes, has been having off and on fevers.      Fmla Paperwork     Seen at [facility] for a cough on 19.            Assessment / Plan / Medical Decision Makin. Bronchitis  - predniSONE (DELTASONE) 20 MG tablet; Take 40 mg by mouth daily for 5 days.  Dispense: 10 tablet; Refill: 0  Recommend patient utilize Mucinex DM 12-hour increase water intake rest symptom relief is encouraged.    Patient advised if symptoms persist, worsen or new symptoms arise they are to seek medical care.  All patients questions addressed. Patient verbalized understanding and agreement with plan.   No orders of the defined types were placed in this encounter.  Followup: No Follow-up on file. earlier if needed.    Health Maintenance Review  Health Maintenance   Topic Date Due     ADVANCE DIRECTIVES DISCUSSED WITH PATIENT  1996     INFLUENZA VACCINE RULE BASED (1) 2018     TD 18+ HE  07/15/2019     TDAP ADULT ONE TIME DOSE  Completed         I am having Jaspal Pascual start on predniSONE. I am also having him maintain his albuterol, sertraline, DM/p-ephed/acetaminoph/doxylam (NYQUIL D ORAL), fluticasone, benzonatate, and disulfiram.      HPI:  Jaspal Pascual is a 39 y.o. year old who presents to the office today for great than 2 week history of cough, fatigue, sore chest with cough, reported fever.  Patient states he took Nyquil a couple of times and is not currently taking any OTC pain/fever reducer.  He was prescribed tessalon perrles 200mg 3 times per day and does note a little relief.  Patient also prescribed Flonase and reports he is  "using this as needed and has found some relief.  Patient has a history of nicotine dependency he quit in .  He has been prescribed a albuterol inhaler in the past though patient reports due to insurance covers he did not obtain this prescription.        Review of Systems-unremarkable other than listed in HPI  Current Scheduled Meds:  Outpatient Encounter Medications as of 2019   Medication Sig Dispense Refill     albuterol (PROAIR HFA;PROVENTIL HFA;VENTOLIN HFA) 90 mcg/actuation inhaler Inhale 2 puffs every 6 (six) hours as needed for wheezing or shortness of breath. 1 Inhaler 1     benzonatate (TESSALON) 200 MG capsule Take 1 capsule (200 mg total) by mouth 3 (three) times a day as needed for cough. 30 capsule 0     disulfiram (ANTABUSE) 250 mg tablet Take 250 mg by mouth daily.       DM/p-ephed/acetaminoph/doxylam (NYQUIL D ORAL) Take by mouth.       fluticasone (FLONASE) 50 mcg/actuation nasal spray Administer two sprays to each nostril once daily. 16 g 0     sertraline (ZOLOFT) 100 MG tablet Take 200 mg by mouth daily.       predniSONE (DELTASONE) 20 MG tablet Take 40 mg by mouth daily for 5 days. 10 tablet 0     No facility-administered encounter medications on file as of 2019.      No past medical history on file.  No past surgical history on file.  Social History     Tobacco Use     Smoking status: Former Smoker     Types: Cigarettes     Last attempt to quit: 2006     Years since quittin.0     Smokeless tobacco: Current User     Types: Chew   Substance Use Topics     Alcohol use: Not on file     Drug use: Not on file       Objective / Physical Examination:  Vitals:    19 1616   BP: (!) 128/92   Pulse: 64   Resp: 20   Temp: 98.5  F (36.9  C)   SpO2: 96%   Weight: (!) 247 lb (112 kg)   Height: 6' 2\" (1.88 m)     Wt Readings from Last 3 Encounters:   19 (!) 247 lb (112 kg)   19 (!) 248 lb 1.6 oz (112.5 kg)   01/15/19 (!) 250 lb (113.4 kg)     Body mass index is 31.71 " kg/m .     General Appearance: Alert and oriented, cooperative, affect appropriate, speech clear, in no apparent distress  Head: Normocephalic, atraumatic  Ears: Tympanic membrane clear with landmarks well visualized bilaterally  Eyes: PERRL,  Conjunctivae clear and sclerae non-icteric  Nose: Septum midline, congestion and bogginess with clear nasal drainage noted  Throat: Lips and mucosa moist.  Posterior pharynx mild erythema without exudate postnasal drainage is noted  Neck: Supple, trachea midline. No cervical adenopathy  Lungs: Grossly clear to auscultation anterior-posterior bilaterally.. No adventitious lung sounds noted. Normal inspiratory and expiratory effort  Cardiovascular: Regular rate, normal S1, S2. No murmurs, rubs, or gallops  Integumentary: Warm and dry. Without suspicious looking lesions  Neuro: Alert and oriented, follows commands appropriately.     Xr Chest 2 Views    Result Date: 1/22/2019  EXAM DATE:         01/22/2019 [facility] X-RAY CHEST, 2 VIEWS, FRONTAL AND LATERAL 1/22/2019 1:15 PM INDICATION: cough, chills, exposure to pneumonia COMPARISON: 12/21/2017 FINDINGS: Negative chest.     Recent Results (from the past 240 hour(s))   Rapid Strep A Screen-Throat   Result Value Ref Range    Rapid Strep A Antigen No Group A Strep detected, presumptive negative No Group A Strep detected, presumptive negative   Group A Strep, RNA Direct Detection, Throat   Result Value Ref Range    Group A Strep by PCR No Group A Strep rRNA detected No Group A Strep rRNA detected             Pema Zuniga, CNP